# Patient Record
Sex: MALE | Race: ASIAN | NOT HISPANIC OR LATINO
[De-identification: names, ages, dates, MRNs, and addresses within clinical notes are randomized per-mention and may not be internally consistent; named-entity substitution may affect disease eponyms.]

---

## 2023-03-24 ENCOUNTER — APPOINTMENT (OUTPATIENT)
Dept: UROLOGY | Facility: CLINIC | Age: 58
End: 2023-03-24
Payer: COMMERCIAL

## 2023-03-24 VITALS
OXYGEN SATURATION: 98 % | SYSTOLIC BLOOD PRESSURE: 153 MMHG | RESPIRATION RATE: 18 BRPM | BODY MASS INDEX: 21.84 KG/M2 | TEMPERATURE: 97.8 F | HEART RATE: 76 BPM | DIASTOLIC BLOOD PRESSURE: 83 MMHG | HEIGHT: 65.75 IN | WEIGHT: 134.3 LBS

## 2023-03-24 DIAGNOSIS — Z78.9 OTHER SPECIFIED HEALTH STATUS: ICD-10-CM

## 2023-03-24 PROCEDURE — 99204 OFFICE O/P NEW MOD 45 MIN: CPT

## 2023-03-24 RX ORDER — TAMSULOSIN HYDROCHLORIDE 0.4 MG/1
0.4 CAPSULE ORAL
Qty: 30 | Refills: 0 | Status: ACTIVE | COMMUNITY
Start: 2023-03-24 | End: 1900-01-01

## 2023-03-27 LAB
ANION GAP SERPL CALC-SCNC: 13 MMOL/L
BACTERIA UR CULT: NORMAL
BASOPHILS # BLD AUTO: 0.06 K/UL
BASOPHILS NFR BLD AUTO: 1.3 %
BUN SERPL-MCNC: 23 MG/DL
CALCIUM SERPL-MCNC: 9.8 MG/DL
CHLORIDE SERPL-SCNC: 102 MMOL/L
CO2 SERPL-SCNC: 24 MMOL/L
CREAT SERPL-MCNC: 1.02 MG/DL
EGFR: 85 ML/MIN/1.73M2
EOSINOPHIL # BLD AUTO: 0.11 K/UL
EOSINOPHIL NFR BLD AUTO: 2.3 %
GLUCOSE SERPL-MCNC: 82 MG/DL
HCT VFR BLD CALC: 43.8 %
HGB BLD-MCNC: 14.7 G/DL
IMM GRANULOCYTES NFR BLD AUTO: 0.2 %
LYMPHOCYTES # BLD AUTO: 0.96 K/UL
LYMPHOCYTES NFR BLD AUTO: 20.3 %
MAN DIFF?: NORMAL
MCHC RBC-ENTMCNC: 30.8 PG
MCHC RBC-ENTMCNC: 33.6 GM/DL
MCV RBC AUTO: 91.8 FL
MONOCYTES # BLD AUTO: 0.46 K/UL
MONOCYTES NFR BLD AUTO: 9.7 %
NEUTROPHILS # BLD AUTO: 3.13 K/UL
NEUTROPHILS NFR BLD AUTO: 66.2 %
PLATELET # BLD AUTO: 261 K/UL
POTASSIUM SERPL-SCNC: 4.7 MMOL/L
PSA FREE FLD-MCNC: 38 %
PSA FREE SERPL-MCNC: 0.38 NG/ML
PSA SERPL-MCNC: 1 NG/ML
RBC # BLD: 4.77 M/UL
RBC # FLD: 12.3 %
SODIUM SERPL-SCNC: 140 MMOL/L
WBC # FLD AUTO: 4.73 K/UL

## 2023-03-30 NOTE — LETTER BODY
[FreeTextEntry1] : Hao Forrest MD\par [id number],\[place] [address]\par [phone]\par \par Dear Dr. Forrest,\par \par Reason for visit: Left flank pain. Left ureteral stone.\par \par This is a 57 year-old Mandarin speaking gentleman with left flank pain and left ureteral stone. Patient underwent abdomen CT scan in February 2023, which demonstrated an 8 mm left ureteral stone. He reports left flank pain. Patient denies any hematuria or urinary difficulties. Patient denies any urinary incontinence. The patient denies any aggravating or relieving factors. The patient denies any interference of function. The patient is entirely asymptomatic. All other review of systems are negative. He has no cancer in his family medical history. He has no previous surgical history. Past medical history, family history and social history were inquired and were noncontributory to current condition. The patient does not use tobacco or drink alcohol. Medications and allergies were reviewed. He has no known allergies to medication.\par \par On examination, the patient is a healthy-appearing gentleman in no acute distress. He is alert and oriented follows commands. He has normal mood and affect. He is normocephalic. Neck is supple. Respirations are unlabored. His abdomen is soft and nontender. Bladder is nonpalpable. No CVA tenderness. Neurologically he is grossly intact. No peripheral edema. Skin without gross abnormality. He has normal male external genitalia. Normal meatus. Bilateral testes are descended intrascrotally and normal to palpation. On rectal examination, there is normal sphincter tone. The prostate is clinically benign without focal induration or nodularity.\par \par His CMP in January 2023 demonstrated normal renal functions, creatinine 0.91. His PSA was 0.78, which is within normal limits. His urinalysis demonstrated hematuria which is consistent with his left ureteral stone, >60 RBC/HPF.\par \par I personally reviewed CT scan with the patient today and images demonstrated an 8 mm left ureteral stone.\par \par Assessment: Left flank pain. Left ureteral stone.\par \par I counseled the patient. I recommend he obtained renal ultrasound today to ensure stability. I also recommended he consider undergoing left ureteroscopy, laser lithotripsy, stone extraction, and stent placement. I counseled the patient regarding the procedure. The risks and benefits were discussed. Alternatives were given. I answered the patient questions. The patient will take the necessary preparations for the procedure, including activated partial thromboplastin time, BMP, CBC w/ DIFF, culture, prothrombin Time and INR. I recommended he obtain PSA, BMP, and urine culture to ensure stability. The patient will follow-up as directed and will contact me with any questions or concerns.\par \par Plan: Renal ultrasound. Left ureteroscopy, preoperative labs. PSA. BMP. Urine culture. Follow up as directed.\par \par I personally reviewed ultrasound images with the patient today and images demonstrated mild hydroureteronephrosis present in the left kidney, secondary to a 7~8 mm echogenics focus with shadow and twinkle in the left distal ureter. Bilateral nonvascular cysts noted, measured up to 1.4 cm. Both kidneys appear normal in size and echogenicity. No intra renal stones or solid masses visualized.

## 2023-03-30 NOTE — ADDENDUM
[FreeTextEntry1] : Entered by ORION LONG, acting as scribe for Dr. Grzegorz Shah.\par The documentation recorded by the scribe accurately reflects the service I personally performed and the decisions made by me.

## 2023-04-21 ENCOUNTER — APPOINTMENT (OUTPATIENT)
Dept: UROLOGY | Facility: CLINIC | Age: 58
End: 2023-04-21
Payer: COMMERCIAL

## 2023-04-21 VITALS — TEMPERATURE: 98 F

## 2023-04-21 DIAGNOSIS — Z00.00 ENCOUNTER FOR GENERAL ADULT MEDICAL EXAMINATION W/OUT ABNORMAL FINDINGS: ICD-10-CM

## 2023-04-21 DIAGNOSIS — N20.1 CALCULUS OF URETER: ICD-10-CM

## 2023-04-21 PROCEDURE — 99214 OFFICE O/P EST MOD 30 MIN: CPT

## 2023-04-23 NOTE — LETTER BODY
[FreeTextEntry1] : Hao Forrest MD\par [address] [address]\par [phone]\par \par Dear Dr. Forrest,\par \par Reason for visit: Left flank pain. Left ureteral stone.\par \par This is a 58 year-old Mandarin speaking gentleman with left flank pain and left ureteral stone. Patient underwent abdomen CT scan in February 2023, which demonstrated an 8 mm left ureteral stone. His renal ultrasound in March 2023 demonstrated mild left hydroureteronephrosis secondary to an 8 mm left ureteral stone. He reports left flank pain. The patient returns today for follow up renal ultrasound. Since he was last seen, the patient reports taking Flomax for stone expulsion therapy regularly without any difficulties or side effects. He reports no changes in health. Patient denies any hematuria or urinary difficulties. Patient denies any urinary incontinence. The patient denies any aggravating or relieving factors. The patient denies any interference of function. The patient is entirely asymptomatic. All other review of systems are negative. He has no cancer in his family medical history. He has no previous surgical history. Past medical history, family history and social history were inquired and were noncontributory to current condition. The patient does not use tobacco or drink alcohol. Medications and allergies were reviewed. He has no known allergies to medication.\par \par On examination, the patient is a healthy-appearing gentleman in no acute distress. He is alert and oriented follows commands. He has normal mood and affect. He is normocephalic. Neck is supple. Respirations are unlabored. His abdomen is soft and nontender. Bladder is nonpalpable. No CVA tenderness. Neurologically he is grossly intact. No peripheral edema. Skin without gross abnormality. He has normal male external genitalia. Normal meatus. Bilateral testes are descended intrascrotally and normal to palpation. On rectal examination, there is normal sphincter tone. The prostate is clinically benign without focal induration or nodularity.\par \par His BMP in March 2023 demonstrated normal renal functions, creatinine 1.02. His PSA was 1.00, which is within normal limits. His urine culture was negative.\par \par I personally reviewed ultrasound images with the patient today and images again demonstrated a 9 mm echogenic focus with shadow and twinkle in the left UVJ. MIld hydronephrosis noted in the left kidney. Again bilateral nonvascular cysts noted. Both kidneys appear normal in size and echogenicity. No intra renal stones or solid masses visualized.\par \par Assessment: Left flank pain. Left ureteral stone.\par \par I counseled the patient. His ultrasound today demonstrated persistent left ureteral stone with left hydronephrosis. I recommended the patient consider left ureteroscopy, laser lithotripsy, stone extraction, and stent placement. I counseled the patient regarding the procedure. The risks and benefits were discussed. Alternatives were given. I answered the patient questions. The patient wishes to proceed with a CT scan to confirm diagnosis before considering surgery. If his CT scan demonstrates persistent left ureteral stone, I recommended the patient consider left ureteroscopy. Risks and alternatives were discussed. I answered the patient questions. The patient will follow-up as directed and will contact me with any questions or concerns. Thank you for the opportunity to participate in the care of this patient, I will keep you updated on his progress. \par \par Plan: Abdomen CT scan. Consider left ureteroscopy. Follow up as directed.

## 2023-05-09 ENCOUNTER — NON-APPOINTMENT (OUTPATIENT)
Age: 58
End: 2023-05-09

## 2023-11-03 ENCOUNTER — APPOINTMENT (OUTPATIENT)
Dept: UROLOGY | Facility: CLINIC | Age: 58
End: 2023-11-03
Payer: COMMERCIAL

## 2023-11-03 PROCEDURE — 99213 OFFICE O/P EST LOW 20 MIN: CPT

## 2023-11-03 PROCEDURE — 76775 US EXAM ABDO BACK WALL LIM: CPT

## 2024-05-31 ENCOUNTER — APPOINTMENT (OUTPATIENT)
Dept: UROLOGY | Facility: CLINIC | Age: 59
End: 2024-05-31

## 2024-10-11 ENCOUNTER — APPOINTMENT (OUTPATIENT)
Dept: UROLOGY | Facility: CLINIC | Age: 59
End: 2024-10-11
Payer: COMMERCIAL

## 2024-10-11 VITALS
HEIGHT: 65.75 IN | TEMPERATURE: 97.5 F | DIASTOLIC BLOOD PRESSURE: 74 MMHG | SYSTOLIC BLOOD PRESSURE: 147 MMHG | OXYGEN SATURATION: 95 % | WEIGHT: 199 LBS | BODY MASS INDEX: 32.36 KG/M2 | HEART RATE: 88 BPM

## 2024-10-11 DIAGNOSIS — Z00.00 ENCOUNTER FOR GENERAL ADULT MEDICAL EXAMINATION W/OUT ABNORMAL FINDINGS: ICD-10-CM

## 2024-10-11 DIAGNOSIS — N20.1 CALCULUS OF URETER: ICD-10-CM

## 2024-10-11 PROCEDURE — 99213 OFFICE O/P EST LOW 20 MIN: CPT

## 2024-10-11 PROCEDURE — 76775 US EXAM ABDO BACK WALL LIM: CPT
